# Patient Record
Sex: FEMALE | Race: WHITE | URBAN - METROPOLITAN AREA
[De-identification: names, ages, dates, MRNs, and addresses within clinical notes are randomized per-mention and may not be internally consistent; named-entity substitution may affect disease eponyms.]

---

## 2021-11-17 ENCOUNTER — OFFICE VISIT (OUTPATIENT)
Dept: ORTHOPEDIC SURGERY | Age: 46
End: 2021-11-17
Payer: COMMERCIAL

## 2021-11-17 VITALS — BODY MASS INDEX: 28.63 KG/M2 | HEIGHT: 70 IN | WEIGHT: 200 LBS | RESPIRATION RATE: 16 BRPM

## 2021-11-17 DIAGNOSIS — S63.641A SPRAIN OF METACARPOPHALANGEAL (MCP) JOINT OF RIGHT THUMB, INITIAL ENCOUNTER: Primary | ICD-10-CM

## 2021-11-17 PROCEDURE — 99244 OFF/OP CNSLTJ NEW/EST MOD 40: CPT | Performed by: ORTHOPAEDIC SURGERY

## 2021-11-17 RX ORDER — HYDROCHLOROTHIAZIDE 12.5 MG/1
CAPSULE, GELATIN COATED ORAL
COMMUNITY
Start: 2021-07-14

## 2021-11-17 RX ORDER — FOLIC ACID 1 MG/1
1 TABLET ORAL DAILY
COMMUNITY
Start: 2021-05-14

## 2021-11-17 RX ORDER — OXCARBAZEPINE 300 MG/1
300 TABLET, FILM COATED ORAL 2 TIMES DAILY
COMMUNITY

## 2021-11-17 RX ORDER — LOSARTAN POTASSIUM 50 MG/1
TABLET ORAL
COMMUNITY
Start: 2021-04-21

## 2021-11-17 RX ORDER — MULTIVITAMIN WITH IRON
TABLET ORAL
COMMUNITY

## 2021-11-17 RX ORDER — FEXOFENADINE HCL 180 MG/1
180 TABLET ORAL DAILY
COMMUNITY

## 2021-11-17 RX ORDER — ASCORBIC ACID 250 MG
TABLET ORAL DAILY
COMMUNITY

## 2021-11-17 NOTE — PROGRESS NOTES
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 She noticed pain, no swelling and mild bruising. She was evaluated at the [address] [place] [address] facility. Xrays were obtained and the patient was  referred to Dr. Sondra Nuñez for hand/upper extremity evaluation and treatment. He saw her on 9/13/21 and 11/17/21 and diagnosed a sprain injury of her right thumb UCL. He advised her that her symptoms would likely persist for several additional months. She was treated with bracing, physical therapy and has been on light duty at work ever since. An MRI was done, showing a sprain of the UCL of the thumb, but no complete tear. There is no history of additional significant injury. Symptoms have significantly improved since the date of injury. The pain assessment has been reviewed and is correct. The patient's social history, past medical history, family history, medications, allergies and review of systems, entered 11/17/21,  have been reviewed, and dated and are recorded in the chart. On physical examination the patient is Height: 5' 10\" (177.8 cm) tall and weighs Weight: 200 lb (90.7 kg). Respirations are 18 per minute. The patient is well nourished, is oriented to time and place, demonstrates appropriate mood and affect as well as normal gait and station. There is minimal soft tissue swelling present about the right thumb, MCP joint. There is no discoloration. There is no deformity. Tenderness is not present on palpation in the area of the right thumb. There is no thickening of the collateral ligaments of the MP joint of the right thumb and no Stener lesion present.   Range of motion of the thumb is minimally limited only on the right, possibly secondary to chronic brace wear, and is not accompanied by pain. Skin is intact, as is distal circulation and sensation. Gross muscle strength is mildly limited only on the right   Hand and wrist joints are stable, including the collateral ligaments of the MP joint of the right thumb. There are no subcutaneous nodules or enlarged epitrochlear lymph nodes. I have personally reviewed and interpreted all previous external imaging studies(MRI), laboratory tests, diagnostic proceedures and medical encounters pertinent to this patient's visit today. Impression: Grade 2 sprain ulnar collateral ligament of the right thumb, healed with some residual symptoms. The nature of this medical problem is fully discussed with the patient, including all treatment options. All questions are answered. She is reassured that her ligament injury is now healed and that additional treatment is not indicated. I feel that at this time she has reached maximum medical improvement. I feel that she is fit to return to full duty, but will leave this decision up to Dr. Herminio Silverman, who is her physician of record under [facility]. Today I have spent 45 minutes with this patient including most or all of the following: reviewing the patient's record, independently interpreting tests and Xrays, obtaining a medical history, preforming a physical examination, making a diagnosis, counseling the patient/family/caregiver, ordering medications, tests or procedures, documenting clinical information in the electronic medical record and communicating the results of the encounter to the patient, family, caregiver, primary care and referring physician/practitioner.